# Patient Record
Sex: FEMALE | Race: OTHER
[De-identification: names, ages, dates, MRNs, and addresses within clinical notes are randomized per-mention and may not be internally consistent; named-entity substitution may affect disease eponyms.]

---

## 2018-09-15 ENCOUNTER — HOSPITAL ENCOUNTER (EMERGENCY)
Dept: HOSPITAL 36 - ER | Age: 43
Discharge: HOME | End: 2018-09-15
Payer: MEDICAID

## 2018-09-15 DIAGNOSIS — Z98.890: ICD-10-CM

## 2018-09-15 DIAGNOSIS — N39.0: Primary | ICD-10-CM

## 2018-09-15 DIAGNOSIS — R42: ICD-10-CM

## 2018-09-15 LAB
ALBUMIN SERPL-MCNC: 4.3 GM/DL (ref 3.7–5.3)
ALBUMIN/GLOB SERPL: 1.4 {RATIO} (ref 1–1.8)
ALP SERPL-CCNC: 62 U/L (ref 34–104)
ALT SERPL-CCNC: 9 U/L (ref 7–52)
ANION GAP SERPL CALC-SCNC: 10.6 MMOL/L (ref 7–16)
APPEARANCE UR: (no result)
AST SERPL-CCNC: 15 U/L (ref 13–39)
BACTERIA #/AREA URNS HPF: (no result) /HPF
BASOPHILS # BLD AUTO: 0 TH/CUMM (ref 0–0.2)
BASOPHILS NFR BLD AUTO: 0.6 % (ref 0–2)
BILIRUB SERPL-MCNC: 0.4 MG/DL (ref 0.3–1)
BILIRUB UR-MCNC: NEGATIVE MG/DL
BUN SERPL-MCNC: 11 MG/DL (ref 7–25)
CALCIUM SERPL-MCNC: 9.3 MG/DL (ref 8.6–10.3)
CHLORIDE SERPL-SCNC: 103 MEQ/L (ref 98–107)
CO2 SERPL-SCNC: 25.2 MEQ/L (ref 21–31)
COLOR UR: YELLOW
CREAT SERPL-MCNC: 0.6 MG/DL (ref 0.6–1.2)
EOSINOPHIL # BLD AUTO: 0.2 TH/CMM (ref 0.1–0.4)
EOSINOPHIL NFR BLD AUTO: 2 % (ref 0–5)
EPI CELLS URNS QL MICRO: (no result) /LPF
ERYTHROCYTE [DISTWIDTH] IN BLOOD BY AUTOMATED COUNT: 13.4 % (ref 11.5–20)
GLOBULIN SER-MCNC: 3.1 GM/DL
GLUCOSE SERPL-MCNC: 102 MG/DL (ref 70–105)
GLUCOSE UR STRIP-MCNC: NEGATIVE MG/DL
HCT VFR BLD CALC: 35.1 % (ref 41–60)
HGB BLD-MCNC: 11.7 GM/DL (ref 12–16)
KETONES UR STRIP-MCNC: NEGATIVE MG/DL
LEUKOCYTE ESTERASE UR-ACNC: NEGATIVE
LYMPHOCYTE AB SER FC-ACNC: 2.5 TH/CMM (ref 1.5–3)
LYMPHOCYTES NFR BLD AUTO: 31.5 % (ref 20–50)
MCH RBC QN AUTO: 29.9 PG (ref 27–31)
MCHC RBC AUTO-ENTMCNC: 33.3 PG (ref 28–36)
MCV RBC AUTO: 89.9 FL (ref 81–100)
MICRO URNS: YES
MONOCYTES # BLD AUTO: 0.5 TH/CMM (ref 0.3–1)
MONOCYTES NFR BLD AUTO: 6.9 % (ref 2–10)
NEUTROPHILS # BLD: 4.6 TH/CMM (ref 1.8–8)
NEUTROPHILS NFR BLD AUTO: 59 % (ref 40–80)
NITRITE UR QL STRIP: NEGATIVE
PH UR STRIP: 7.5 [PH] (ref 4.6–8)
PLATELET # BLD: 248 TH/CMM (ref 150–400)
PMV BLD AUTO: 7.5 FL
POTASSIUM SERPL-SCNC: 3.8 MEQ/L (ref 3.5–5.1)
PROT UR STRIP-MCNC: NEGATIVE MG/DL
RBC # BLD AUTO: 3.91 MIL/CMM (ref 3.8–5.1)
RBC # UR STRIP: NEGATIVE /UL
RBC #/AREA URNS HPF: (no result) /HPF (ref 0–5)
SODIUM SERPL-SCNC: 135 MEQ/L (ref 136–145)
SP GR UR STRIP: 1.01 (ref 1–1.03)
URINALYSIS COMPLETE PNL UR: (no result)
UROBILINOGEN UR STRIP-ACNC: 0.2 E.U./DL (ref 0.2–1)
WBC # BLD AUTO: 7.8 TH/CMM (ref 4.8–10.8)
WBC #/AREA URNS HPF: (no result) /HPF (ref 0–5)

## 2018-09-15 PROCEDURE — 81025 URINE PREGNANCY TEST: CPT

## 2018-09-15 PROCEDURE — 84443 ASSAY THYROID STIM HORMONE: CPT

## 2018-09-15 PROCEDURE — 36415 COLL VENOUS BLD VENIPUNCTURE: CPT

## 2018-09-15 PROCEDURE — 96372 THER/PROPH/DIAG INJ SC/IM: CPT

## 2018-09-15 PROCEDURE — Z7502: HCPCS

## 2018-09-15 PROCEDURE — 81001 URINALYSIS AUTO W/SCOPE: CPT

## 2018-09-15 PROCEDURE — Z7610: HCPCS

## 2018-09-15 PROCEDURE — 80053 COMPREHEN METABOLIC PANEL: CPT

## 2018-09-15 PROCEDURE — 99284 EMERGENCY DEPT VISIT MOD MDM: CPT

## 2018-09-15 PROCEDURE — 85025 COMPLETE CBC W/AUTO DIFF WBC: CPT

## 2018-09-15 NOTE — ED PHYSICIAN CHART
ED Chief Complaint/HPI





- Patient Information


Date Seen:: 09/15/18


Time Seen:: 14:20


Chief Complaint:: dizziness


History of Present Illness:: 





THIS IS A 44 YO FEMALE WITH SEVERAL BOUTS OF DIZZINESS WITHOUT LOSS OF 

CONSCIOUSNESS.   SHE DENIES, FEVER, HEADACHE BUT ADMITS TO VOMITING ONCE.  SHE 

DENIES DIARRHEA AND CONSTIPATION.   SHE DENIES PAINFUL OR FREQUENT URINATION.   

SHE DENIES HYPERTENSION AND HEART DISEASE. SHE DENIES A RECENT COUGH OR COLD 

AND DENIES HAVING A HEADACHE.


Allergies:: 


 Allergies











Allergy/AdvReac Type Severity Reaction Status Date / Time


 


No Known Allergies Allergy   Verified 09/15/18 14:13











Vitals:: 


 Vital Signs - 8 hr











  09/15/18





  14:13


 


Temp 98.1 F


 


HR 83


 


RR 16


 


/86


 


O2 Sat % 97











Historian:: Patient


Review:: Nurse's Note Reviewed





ED Review of Systems





- Review of Systems


General/Constitutional: No fever, No chills, No weight loss, No weakness, No 

diaphoresis, No edema, No loss of appetite


Skin: No skin lesions, No rash, No bruising


Head: No headache, No light-headedness


Eyes: No loss of vision, No pain, No diplopia


ENT: No earache, No nasal drainage, No sore throat, No tinnitus


Neck: No neck pain, No swelling, No thyromegaly, No stiffness, No mass noted


Cardio Vascular: No chest pain, No palpitations, No PND, No orthopnea, No edema


Pulmonary: No SOB, No cough, No sputum, No wheezing


GI: No nausea, No vomiting, No diarrhea, No pain, No melena, No hematochezia, 

No constipation, No hematemesis


G/U: No dysuria, No frequency, No hematuria


Musculoskeletal: No bone or joint pain, No back pain, No muscle pain


Endocrine: No polyuria, No polydipsia


Psychiatric: No prior psych history, No depression, No anxiety, No suicidal 

ideation


Hematopoietic: No bruising, No lymphadenopathy


Allergic/Immuno: No urticaria, No angioedema


Neurological: No syncope, No focal symptoms, No weakness, No paresthesia, No 

headache, No seizure, Dizziness, No confusion, No vertigo





ED Past Medical History





- Past Medical History


Obtainable: Yes


Past Medical History: No significant medical hx


Family History: None


Social History: Non Smoker, No Alcohol, No Drug Use, Employed


Surgical History:  (THREE C-SECTIONS.)





Family Medical History





- Family Member


  ** Mother


History Unknown: Yes


Ethnicity: 


Hx Family Cancer: No


Hx Family Coronary Artery Disease: No


Hx Family Hypertension: No


Hx Family Diabetes: No


Hx Family Seizures: No


Hx Family HIV: No


Hx Family COPD: No


Hx Family Hepatitis: No


Hx Family Psychiatric Problems: No





ED Physical Exam





- Physical Examination


General/Constitutional: Awake, Well-developed, well-nourished, Alert, No 

distress, GCS 15, Non-toxic appearing, Ambulatory


Head: Atraumatic


Eyes: Lids, conjuctiva normal, PERRL, EOMI


Skin: Nl inspection, No rash, No skin lesions, No ecchymosis, Well hydrated, No 

lymphadenopathy


ENMT: External ears, nose nl, Nasal exam nl, Lips, teeth, gums nl


Neck: Nontender, Full ROM w/o pain, No JVD, No nuchal rigidity, No bruit, No 

mass, No stridor


Respiratory: Nl effort/Exclusion, Clear to Auscultation, No Wheeze/Rhonchi/Rales


Cardio Vascular: RRR, No murmur, gallop, rubs, NL S1 S2


GI: No tenderness/rebounding/guarding, No organomegaly, No hernia, Normal BS's, 

Nondistended, No mass/bruits, No McBurney tenderness


: No CVA tenderness


Extremities: No tenderness or effusion, Full ROM, normal strength in all 

extremities, No edema, Normal digits & nails


Neuro/Psych: Alert/oriented, DTR's symmetric, Normal sensory exam, Normal motor 

strength, Judgement/insight normal, Mood normal, Normal gait, No focal deficits


Misc: Normal back, No paraspinal tenderness





ED Labs/Radiology/EKG Results





- Lab Results


Results: 


 Laboratory Tests











  09/15/18





  14:26


 


POC Ur Pregnancy Test  Negative








 Abnormal Lab Results











  09/15/18 09/15/18 09/15/18





  14:15 14:26 14:35


 


WBC    7.8


 


RBC    3.91


 


Hgb    11.7 L


 


Hct    35.1 L


 


MCV    89.9


 


MCH    29.9


 


MCHC Differential    33.3


 


RDW    13.4


 


Plt Count    248


 


MPV    7.5


 


Neutrophils %    59.0


 


Lymphocytes %    31.5


 


Monocytes %    6.9


 


Eosinophils %    2.0


 


Basophils %    0.6


 


Sodium   


 


Potassium   


 


Chloride   


 


Carbon Dioxide   


 


Anion Gap   


 


BUN   


 


Creatinine   


 


Est GFR ( Amer)   


 


Est GFR (Non-Af Amer)   


 


BUN/Creatinine Ratio   


 


Glucose   


 


Calcium   


 


Total Bilirubin   


 


AST   


 


ALT   


 


Alkaline Phosphatase   


 


Total Protein   


 


Albumin   


 


Globulin   


 


Albumin/Globulin Ratio   


 


Urine Source  CLEAN C  


 


Urine Color  YELLOW  


 


Urine Clarity  HAZY  


 


Urine pH  7.5  


 


Ur Specific Gravity  1.015  


 


Urine Protein  NEGATIVE  


 


Urine Glucose (UA)  NEGATIVE  


 


Urine Ketones  NEGATIVE  


 


Urine Blood  NEGATIVE  


 


Urine Nitrate  NEGATIVE  


 


Urine Bilirubin  NEGATIVE  


 


Urine Urobilinogen  0.2  


 


Ur Leukocyte Esterase  NEGATIVE  


 


Urine RBC  0-2  


 


Urine WBC  2-5  


 


Ur Epithelial Cells  MODERATE  


 


Urine Bacteria  1+ H  


 


POC Ur Pregnancy Test   Negative 














  09/15/18





  14:35


 


WBC 


 


RBC 


 


Hgb 


 


Hct 


 


MCV 


 


MCH 


 


MCHC Differential 


 


RDW 


 


Plt Count 


 


MPV 


 


Neutrophils % 


 


Lymphocytes % 


 


Monocytes % 


 


Eosinophils % 


 


Basophils % 


 


Sodium  135 L


 


Potassium  3.8


 


Chloride  103


 


Carbon Dioxide  25.2


 


Anion Gap  10.6


 


BUN  11


 


Creatinine  0.6


 


Est GFR ( Amer)  > 60.0


 


Est GFR (Non-Af Amer)  > 60.0


 


BUN/Creatinine Ratio  18.3


 


Glucose  102


 


Calcium  9.3


 


Total Bilirubin  0.4


 


AST  15


 


ALT  9


 


Alkaline Phosphatase  62


 


Total Protein  7.4


 


Albumin  4.3


 


Globulin  3.1


 


Albumin/Globulin Ratio  1.4


 


Urine Source 


 


Urine Color 


 


Urine Clarity 


 


Urine pH 


 


Ur Specific Gravity 


 


Urine Protein 


 


Urine Glucose (UA) 


 


Urine Ketones 


 


Urine Blood 


 


Urine Nitrate 


 


Urine Bilirubin 


 


Urine Urobilinogen 


 


Ur Leukocyte Esterase 


 


Urine RBC 


 


Urine WBC 


 


Ur Epithelial Cells 


 


Urine Bacteria 


 


POC Ur Pregnancy Test 














ED Assessment





- Assessment


General Assessment: 





DIZZINESS





ED Septic Shock





- .


Is Septic Shock (SBP<90, OR Lactate>4 mmol\L) present?: No





- <6hrs of presentation:


Vital Signs: 


 Vital Signs - 8 hr











  09/15/18





  14:13


 


Temp 98.1 F


 


HR 83


 


RR 16


 


/86


 


O2 Sat % 97














ED Reassessment (Disposition)





- Reassessment


Reassessment Condition:: Improved





- Diagnosis


Diagnosis:: 





URINARY TRACT INFECTION





- Aftercare/Follow up Instructions


Aftercare/Follow-Up Instructions:: Counseled pt regarding lab results/diagnosis 

& need follow up, Refer to Discharge Instructions, Counseled pt & family 

regarding lab results/diagnosis & need follow up





- Patient Disposition


Discharge/Transfer:: Home


Condition at Disposition:: Improved